# Patient Record
Sex: FEMALE | Race: WHITE
[De-identification: names, ages, dates, MRNs, and addresses within clinical notes are randomized per-mention and may not be internally consistent; named-entity substitution may affect disease eponyms.]

---

## 2017-05-19 NOTE — DIAGNOSTIC IMAGING REPORT
EXAMINATION: PELVIC ULTRASOUND (transabdominal and endovaginal scanning)



CLINICAL HISTORY: Adnexal mass    



COMPARISON STUDY:  5/11/2016



FINDINGS: 

The uterus measured 7.6 x 3.1 x 4.5 cm. The bulging gland cysts are visualized.

The endometrial stripe measured 9 mm.

The right ovary measured 30 x 18 x 24 mm.

The left ovary measured 38 x 18 x 24 mm..

There is no ultrasonographic evidence of ovarian torsion. It should be noted

that ovarian torsion can be present with normal Doppler ultrasonographic

findings.

There is trace free fluid likely physiologic.



IMPRESSION:  Normal pelvic ultrasound.







Electronically signed by:  Panda Castillo M.D.

5/19/2017 12:24 PM



Dictated Date/Time:  5/19/2017 12:23 PM

## 2017-08-04 NOTE — DIAGNOSTIC IMAGING REPORT
ECTOPIC FETAL



CLINICAL HISTORY: RLQ abd pain, 5 weeks Amadeo pain



TECHNIQUE: Ultrasound



COMPARISON STUDY:  5/19/2017



FINDINGS: Intrauterine gestational sac measuring 1 cm. No fetal pole is

confirmed on this may be secondary to the early gestational age. A yolk sac is

present.



Right ovary measures 3.3 cm at maximum with normal vascular flow. 1.7 cm corpus

luteum cyst. Left ovary measures 2.8 cm with normal vascular flow.



IMPRESSION:  

1. Early intrauterine gestational sac demonstrating a yolk sac.

2. A fetal pole is not confirmed, although this may be secondary to the early

gestational age is 5 weeks.

3. 1.7 cm corpus luteum cyst right ovary.

4. Follow-up ultrasound is recommended in one to 2 weeks to confirm viability









The above report was generated using voice recognition software.  It may contain

grammatical, syntax or spelling errors.







Electronically signed by:  Marcus Kauffman M.D.

8/4/2017 11:07 AM



Dictated Date/Time:  8/4/2017 11:04 AM

## 2017-08-04 NOTE — EMERGENCY ROOM VISIT NOTE
History


Report prepared by Shilpa:  Colleen Woody


Under the Supervision of:  Dr. Elsa Arevalo M.D.


First contact with patient:  09:41


Chief Complaint:  ABDOMINAL PAIN


Stated Complaint:  SEVERE ABD. CRAMPS, HIP PAIN-RIGHT SIDE





History of Present Illness


The patient is a 32 year old female who presents to the Emergency Room with 

complaints of waxing and waning abdominal pain for the past 4 days. The patient 

took a pregnancy test at home 4 days ago because of a missed period. The test 

was positive and she estimates that she is about 5 weeks pregnant. Over the 

past few days she has had some lower abdominal cramping that is worse on the 

right side. She states that her pain has been getting more intense, and last 

night her pain was so severe that she was doubled over. The patient reports 

burning pain into her right hip. She rates her pain as an 8/10 in severity. She 

denies any vaginal bleeding or previous gynecologic problems. This is her first 

pregnancy. Her blood type is A+ and she has never had a blood transfusion. The 

patient has an ob-gyn appointment in 5 days.





   Source of History:  patient


   Onset:  4 days ago


   Position:  abdomen


   Symptom Intensity:  8/10


   Quality:  burning, cramping


   Timing:  waxes/wanes


Note:


Pt denies vaginal bleeding.





Review of Systems


See HPI for pertinent positives & negatives. A total of 10 systems reviewed and 

were otherwise negative.





Past Medical & Surgical


Medical Problems:


(1) Asthma


(2) Bronchitis


Surgical Problems:


(1) H/O wisdom tooth extraction








Family History





Cancer


Heart disease


Hypertension


Kidney disease


Kidney stones





Social History


Smoking Status:  Never Smoker


Smokeless Tobacco Use:  No


Alcohol Use:  occasionally


Marital Status:  in relationship


Housing Status:  lives with significant other


Occupation Status:  employed





Current/Historical Medications


Scheduled


Buspirone Hcl (Buspirone Hcl), 1 TAB PO QPM


Citalopram Hydrobromide (Celexa), 10 MG PO DAILY





Allergies


Coded Allergies:  


     Cefuroxime (Unverified  Adverse Reaction, Unknown, UNK, 5/11/16)


     Clindamycin (Unverified  Adverse Reaction, Unknown, UNK, 5/11/16)


     Doxycycline (Unverified  Adverse Reaction, Unknown, UNK, 5/11/16)


     Erythromycin (Unverified  Adverse Reaction, Unknown, unk, 5/11/16)





Physical Exam


Vital Signs











  Date Time  Temp Pulse Resp B/P (MAP) Pulse Ox O2 Delivery O2 Flow Rate FiO2


 


8/4/17 12:15  89 18 111/63 100   


 


8/4/17 11:19  80 22 105/72 100 Room Air  


 


8/4/17 10:05  89      


 


8/4/17 09:35 36.9 78 20 121/80 100 Room Air  











Physical Exam


Vital signs reviewed.





General: Well-appearing 32 year old female, in no significant distress.





HEENT: No scleral icterus, PERRLA, neck supple.  Atraumatic.





Cardiovascular: Regular rate and rhythm, no extra sounds.





Pulmonary: Clear to auscultation bilaterally, normal work of breathing.





Abdomen: Soft, mild RLQ tenderness, no rebound or guarding, nondistended, 

positive bowel sounds.





Musculoskeletal: Atraumatic, no peripheral edema.





Neurologic: Patient awake alert and oriented x 3, full strength in all 4 

extremities.  Cranial nerves 2 through 12 grossly intact.





Skin: Warm, dry, no rash





Medical Decision & Procedures


ER Provider


Diagnostic Interpretation:


Radiology results as stated below per my review and radiologist interpretation:





ECTOPIC FETAL





CLINICAL HISTORY: RLQ abd pain, 5 weeks Amadeo pain





TECHNIQUE: Ultrasound





COMPARISON STUDY:  5/19/2017





FINDINGS: Intrauterine gestational sac measuring 1 cm. No fetal pole is


confirmed on this may be secondary to the early gestational age. A yolk sac is


present.





Right ovary measures 3.3 cm at maximum with normal vascular flow. 1.7 cm corpus


luteum cyst. Left ovary measures 2.8 cm with normal vascular flow.





IMPRESSION:  


1. Early intrauterine gestational sac demonstrating a yolk sac.


2. A fetal pole is not confirmed, although this may be secondary to the early


gestational age is 5 weeks.


3. 1.7 cm corpus luteum cyst right ovary.


4. Follow-up ultrasound is recommended in one to 2 weeks to confirm viability














The above report was generated using voice recognition software.  It may contain


grammatical, syntax or spelling errors.











Electronically signed by:  Marcus Kauffman M.D.


8/4/2017 11:07 AM





Dictated Date/Time:  8/4/2017 11:04 AM





Laboratory Results


8/4/17 09:51








Red Blood Count 4.20, Mean Corpuscular Volume 88.3, Mean Corpuscular Hemoglobin 

31.4, Mean Corpuscular Hemoglobin Concent 35.6, Mean Platelet Volume 8.9, 

Neutrophils (%) (Auto) 56.8, Lymphocytes (%) (Auto) 31.0, Monocytes (%) (Auto) 

9.4, Eosinophils (%) (Auto) 1.9, Basophils (%) (Auto) 0.7, Neutrophils # (Auto) 

2.34, Lymphocytes # (Auto) 1.28, Monocytes # (Auto) 0.39, Eosinophils # (Auto) 

0.08, Basophils # (Auto) 0.03





8/4/17 09:51

















Test


  8/4/17


09:51 8/4/17


10:05 8/4/17


10:25


 


White Blood Count


  4.13 K/uL


(4.8-10.8) 


  


 


 


Red Blood Count


  4.20 M/uL


(4.2-5.4) 


  


 


 


Hemoglobin


  13.2 g/dL


(12.0-16.0) 


  


 


 


Hematocrit 37.1 % (37-47)   


 


Mean Corpuscular Volume


  88.3 fL


() 


  


 


 


Mean Corpuscular Hemoglobin


  31.4 pg


(25-34) 


  


 


 


Mean Corpuscular Hemoglobin


Concent 35.6 g/dl


(32-36) 


  


 


 


Platelet Count


  303 K/uL


(130-400) 


  


 


 


Mean Platelet Volume


  8.9 fL


(7.4-10.4) 


  


 


 


Neutrophils (%) (Auto) 56.8 %   


 


Lymphocytes (%) (Auto) 31.0 %   


 


Monocytes (%) (Auto) 9.4 %   


 


Eosinophils (%) (Auto) 1.9 %   


 


Basophils (%) (Auto) 0.7 %   


 


Neutrophils # (Auto)


  2.34 K/uL


(1.4-6.5) 


  


 


 


Lymphocytes # (Auto)


  1.28 K/uL


(1.2-3.4) 


  


 


 


Monocytes # (Auto)


  0.39 K/uL


(0.11-0.59) 


  


 


 


Eosinophils # (Auto)


  0.08 K/uL


(0-0.5) 


  


 


 


Basophils # (Auto)


  0.03 K/uL


(0-0.2) 


  


 


 


RDW Standard Deviation


  39.0 fL


(36.4-46.3) 


  


 


 


RDW Coefficient of Variation


  12.1 %


(11.5-14.5) 


  


 


 


Immature Granulocyte % (Auto) 0.2 %   


 


Immature Granulocyte # (Auto)


  0.01 K/uL


(0.00-0.02) 


  


 


 


Anion Gap


  6.0 mmol/L


(3-11) 


  


 


 


Estimated GFR (


American) 131.5 


  


  


 


 


Estimated GFR (Non-


American 113.5 


  


  


 


 


BUN/Creatinine Ratio 16.3 (10-20)   


 


Calcium Level


  8.8 mg/dl


(8.5-10.1) 


  


 


 


Total Bilirubin


  0.4 mg/dl


(0.2-1) 


  


 


 


Direct Bilirubin


  < 0.1 mg/dl


(0-0.2) 


  


 


 


Aspartate Amino Transf


(AST/SGOT) 15 U/L (15-37) 


  


  


 


 


Alanine Aminotransferase


(ALT/SGPT) 19 U/L (12-78) 


  


  


 


 


Alkaline Phosphatase


  49 U/L


() 


  


 


 


Total Protein


  7.2 gm/dl


(6.4-8.2) 


  


 


 


Albumin


  3.9 gm/dl


(3.4-5.0) 


  


 


 


Human Chorionic Gonadotropin,


Qual POS (NEG) 


  


  


 


 


Urine Color  YELLOW  


 


Urine Appearance  CLEAR (CLEAR)  


 


Urine pH  7.0 (4.5-7.5)  


 


Urine Specific Gravity


  


  1.014


(1.000-1.030) 


 


 


Urine Protein  NEG (NEG)  


 


Urine Glucose (UA)  NEG (NEG)  


 


Urine Ketones  NEG (NEG)  


 


Urine Occult Blood  TRACE (NEG)  


 


Urine Nitrite  NEG (NEG)  


 


Urine Bilirubin  NEG (NEG)  


 


Urine Urobilinogen  NEG (NEG)  


 


Urine Leukocyte Esterase  MODERATE (NEG)  


 


Urine WBC (Auto)


  


  5-10 /hpf


(0-5) 


 


 


Urine RBC (Auto)


  


  5-10 /hpf


(0-4) 


 


 


Urine Hyaline Casts (Auto)  1-5 /lpf (0-5)  


 


Urine Epithelial Cells (Auto)  >30 /lpf (0-5)  


 


Urine Bacteria (Auto)  1+ (NEG)  


 


Human Chorionic Gonadotropin,


Quant 


  


  82118 mIU/mL 


 














 Date/Time


Source Procedure


Growth Status


 


 


 8/4/17 10:05


Urine , Clean Catch Urine Culture - Final


THREE TYPES OF ORGANSIMS PRESENT, ALL... Complete





Laboratory results per my review.





Medications Administered











 Medications


  (Trade)  Dose


 Ordered  Sig/Shaun


 Route  Start Time


 Stop Time Status Last Admin


Dose Admin


 


 Sodium Chloride  1,000 ml @ 


 999 mls/hr  Q1H1M STAT


 IV  8/4/17 09:46


 8/4/17 10:46 DC 8/4/17 09:59


999 MLS/HR


 


 Morphine Sulfate


  (MoRPHine


 SULFATE INJ)  4 mg  NOW  STAT


 IV  8/4/17 09:46


 8/4/17 09:48 DC 8/4/17 10:00


4 MG


 


 Ondansetron HCl


  (Zofran Inj)  4 mg  NOW  STAT


 IV  8/4/17 09:46


 8/4/17 09:48 DC 8/4/17 09:59


4 MG











ED Course


0941: Past medical records reviewed. The patient was evaluated in room B12B. A 

complete history and physical examination was performed. 





0946: Zofran 4 mg IV, Morphine sulfate 4 mg IV, NSS 1000 ml @ 999 mls/hr IV 





1140: I updated the patient on her US results. 





1200: I reassessed the patient at this time. She is feeling better and resting 

comfortably. I discussed the results and treatment plan with the patient. I 

answered all pertaining questions that she had. She expressed understanding and 

verbalized agreement. The patient will be discharged home.





Medical Decision


Differential diagnosis:


Etiologies such as appendicitis, diverticulitis, PUD, biliary pathology, UTI, 

pancreatitis, obstruction, mesenteric ischemia, aortic pathology, infections, 

inflammatory bowel disease, renal colic, ectopic pregnancy, intrauterine 

pregnancy, kidney stone, ovarian cyst, as well as others were entertained. 





This patient was evaluated and appeared to be in no significant distress.  IV 

access was obtained and laboratory work was drawn.  Patient was placed on the 

cardiac monitor and found to be cholecystitis rhythm.  Patient was hydrated 

with normal saline solution.  Patient was given morphine and Zofran for her 

discomforts.  Ultrasound pelvis was performed and reveals an IUP.  Patient was 

informed of the findings.  She'll follow-up with her OB/GYN this week and 

return to the ER for worsening of symptoms or any medical concerns.





Impression





 Primary Impression:  


 First trimester pregnancy


 Additional Impression:  


 Right lower quadrant abdominal pain





Scribe Attestation


The scribe's documentation has been prepared under my direction and personally 

reviewed by me in its entirety. I confirm that the note above accurately 

reflects all work, treatment, procedures, and medical decision making performed 

by me.





Departure Information


Dispostion


Home / Self-Care





Referrals


Andres Mondragon M.D. (PCP)





Forms


Call Back Authorization, HOME CARE DOCUMENTATION FORM,                         

                                       IMPORTANT VISIT INFORMATION





Patient Instructions


My [facility]





Additional Instructions





Diagnosis: Right lower quadrant abdominal pain, first trimester pregnancy.





Tylenol 650 mg every 6 hours as needed for pain.





Drink plenty of clear fluids and do not overexert herself.





2 chewable vitamins daily.





Avoid alcohol and illicit substances.





Do not use ibuprofen or aspirin.





Follow-up with your OB/GYN as scheduled if not sooner.





Return to the emergency department for worsening of symptoms or any medical 

concerns.





Problem Qualifiers

## 2018-03-27 ENCOUNTER — HOSPITAL ENCOUNTER (INPATIENT)
Dept: HOSPITAL 45 - C.OPB | Age: 33
LOS: 2 days | Discharge: HOME | End: 2018-03-29
Attending: OBSTETRICS & GYNECOLOGY | Admitting: OBSTETRICS & GYNECOLOGY
Payer: COMMERCIAL

## 2018-03-27 VITALS
SYSTOLIC BLOOD PRESSURE: 129 MMHG | TEMPERATURE: 98.06 F | HEART RATE: 85 BPM | OXYGEN SATURATION: 99 % | DIASTOLIC BLOOD PRESSURE: 80 MMHG

## 2018-03-27 VITALS
WEIGHT: 193.35 LBS | WEIGHT: 193.35 LBS | BODY MASS INDEX: 33.01 KG/M2 | BODY MASS INDEX: 33.01 KG/M2 | HEIGHT: 64.02 IN | HEIGHT: 64.02 IN

## 2018-03-27 VITALS — HEART RATE: 96 BPM | TEMPERATURE: 98.42 F | SYSTOLIC BLOOD PRESSURE: 120 MMHG | DIASTOLIC BLOOD PRESSURE: 79 MMHG

## 2018-03-27 VITALS
SYSTOLIC BLOOD PRESSURE: 121 MMHG | HEART RATE: 118 BPM | DIASTOLIC BLOOD PRESSURE: 77 MMHG | OXYGEN SATURATION: 98 % | TEMPERATURE: 97.88 F

## 2018-03-27 VITALS — HEART RATE: 96 BPM | DIASTOLIC BLOOD PRESSURE: 79 MMHG | TEMPERATURE: 98.78 F | SYSTOLIC BLOOD PRESSURE: 119 MMHG

## 2018-03-27 DIAGNOSIS — Z22.330: ICD-10-CM

## 2018-03-27 DIAGNOSIS — Z3A.39: ICD-10-CM

## 2018-03-27 DIAGNOSIS — F41.9: ICD-10-CM

## 2018-03-27 DIAGNOSIS — O99.513: ICD-10-CM

## 2018-03-27 DIAGNOSIS — J45.909: ICD-10-CM

## 2018-03-27 DIAGNOSIS — F32.9: ICD-10-CM

## 2018-03-27 DIAGNOSIS — O99.343: Primary | ICD-10-CM

## 2018-03-27 LAB
EOSINOPHIL NFR BLD AUTO: 220 K/UL (ref 130–400)
HCT VFR BLD CALC: 38.5 % (ref 37–47)
HGB BLD-MCNC: 13.6 G/DL (ref 12–16)
MCH RBC QN AUTO: 29.8 PG (ref 25–34)
MCHC RBC AUTO-ENTMCNC: 35.3 G/DL (ref 32–36)
MCV RBC AUTO: 84.4 FL (ref 80–100)
PMV BLD AUTO: 10.4 FL (ref 7.4–10.4)
RED CELL DISTRIBUTION WIDTH CV: 13.3 % (ref 11.5–14.5)
RED CELL DISTRIBUTION WIDTH SD: 40 FL (ref 36.4–46.3)
WBC # BLD AUTO: 15.79 K/UL (ref 4.8–10.8)

## 2018-03-27 PROCEDURE — 0KQM0ZZ REPAIR PERINEUM MUSCLE, OPEN APPROACH: ICD-10-PCS | Performed by: OBSTETRICS & GYNECOLOGY

## 2018-03-27 RX ADMIN — Medication PRN MG: at 23:26

## 2018-03-27 RX ADMIN — CITALOPRAM HYDROBROMIDE SCH MG: 20 TABLET ORAL at 21:36

## 2018-03-27 RX ADMIN — Medication PRN MG: at 16:12

## 2018-03-27 RX ADMIN — DOCUSATE SODIUM SCH MG: 100 CAPSULE, LIQUID FILLED ORAL at 20:33

## 2018-03-27 NOTE — DELIVERY SUMMARY
DATE OF OPERATION:  03/27/2018

 

TIME OF DELIVERY OF BABY:  8:59 a.m.

 

TIME OF DELIVERY OF PLACENTA:  9:30 a.m.

 

DETAILS OF ADMISSION AND DELIVERY:  The patient is a 32-year-old G1, P0, at

39 weeks of gestation, who presented to labor and delivery this morning for

labor check.  She started to feel contractions at around 0030am this

morning, they were every 5 minutes initially and then get closer and more

painful.

When she first  presented to L&D her cervix was 3 cm

dilated, 25% effaced, -3, and posterior location and  the contractions were 
every 3-7 minutes.

Her nurse discussed the case with Dr. Hernandez who recommended to recheck her in 
2 hours.  She

ambulated and then she came back to monitor again.  She was having

contractions every 3-4 minutes.  She was taken off from monitor at 7:46 a.m. 

Until then, she was not very uncomfortable.  Her contractions were mild to

moderate and she was able to talk and laugh with them.  She was going to

take another walk until 8:15 but she suddenly got uncomfortable at 8:00 a.m.

and then she was brought back to the bed by YASMIN Johnson, who

checked her and found that she was fully dilated with tight bulging membranes

which spontaneously ruptured.

 I was

called into the room.  When I presented to the room, she was uncomfortable

and involuntarily pushing.  I checked her cervix, has thin rim on the right 
side 

right side,  about 9 cm, 90% effaced and 0 station.  The fetal heart rate

was in 120s.  She had not had IV at that point.  IV was then started and 
pharmacy

was called for penicillin due to GBS positive status.  6 million units of

penicillin were given while she was pushing.  She pushed for about 45 minutes

and delivered the head without difficulty.  Shoulders came right after with the

head with one push.  The baby was handed off to mother where mouth and nose

were suctioned.  Cord was clamped x2 and cut at 1 minute delay.  Cord blood

was obtained.  Vagina and perineum were checked for lacerations.  There was a

second-degree perineal laceration and first-degree right labial laceration. 

Rectal exam was done and confirmed to be second degree.  Excellent sphincter

tone was noted.  Gloves were changed.

1% lidocaine was used for local anesthesia.

The perineal laceration was repaired

with 2-0 Vicryl in a running locked fashion and labial laceration was

repaired with 3-0 Vicryl in running fashion.  Excellent hemostasis was

achieved.  .  Placenta was found to

be in the vagina, delivered spontaneously intact and complete, and lower

segment was cleared of all clots and debris.  Fundus was firm.  EBL was 200. 

Mother and baby tolerated the procedure well.  At the end of the procedure,

sponge, lap, needle and instrument counts were correct x2.  The baby was a

viable female infant.  Apgars 8/9.  Weight was 3205 gr.  No complications

happened.  I was present during the whole procedure.

 

 

I attest to the content of the Intraoperative Record and any orders documented 
therein. Any exceptions are noted below.

 

 

 

MTDD

## 2018-03-28 VITALS
DIASTOLIC BLOOD PRESSURE: 81 MMHG | SYSTOLIC BLOOD PRESSURE: 120 MMHG | OXYGEN SATURATION: 98 % | HEART RATE: 92 BPM | TEMPERATURE: 98.24 F

## 2018-03-28 VITALS
SYSTOLIC BLOOD PRESSURE: 119 MMHG | HEART RATE: 96 BPM | TEMPERATURE: 98.24 F | OXYGEN SATURATION: 98 % | DIASTOLIC BLOOD PRESSURE: 82 MMHG

## 2018-03-28 VITALS — HEART RATE: 74 BPM | TEMPERATURE: 98.24 F | SYSTOLIC BLOOD PRESSURE: 117 MMHG | DIASTOLIC BLOOD PRESSURE: 78 MMHG

## 2018-03-28 VITALS
TEMPERATURE: 98.24 F | HEART RATE: 85 BPM | DIASTOLIC BLOOD PRESSURE: 73 MMHG | SYSTOLIC BLOOD PRESSURE: 113 MMHG | OXYGEN SATURATION: 99 %

## 2018-03-28 LAB
HCT VFR BLD CALC: 29.7 % (ref 37–47)
HGB BLD-MCNC: 10.4 G/DL (ref 12–16)

## 2018-03-28 RX ADMIN — Medication SCH TAB: at 08:38

## 2018-03-28 RX ADMIN — Medication PRN MG: at 08:39

## 2018-03-28 RX ADMIN — Medication PRN MG: at 16:07

## 2018-03-28 RX ADMIN — DOCUSATE SODIUM SCH MG: 100 CAPSULE, LIQUID FILLED ORAL at 20:30

## 2018-03-28 RX ADMIN — CITALOPRAM HYDROBROMIDE SCH MG: 20 TABLET ORAL at 20:30

## 2018-03-28 RX ADMIN — FERROUS SULFATE TAB EC 325 MG (65 MG FE EQUIVALENT) SCH MG: 325 (65 FE) TABLET DELAYED RESPONSE at 08:39

## 2018-03-28 RX ADMIN — DOCUSATE SODIUM SCH MG: 100 CAPSULE, LIQUID FILLED ORAL at 08:38

## 2018-03-28 NOTE — OB/GYN PROGRESS NOTE
OB/GYN Progress Note


Date of Service


Mar 28, 2018.





Subjective


conversation w/ patient, physical exam


Ambulation:  ambulating normally


Voiding:  no voiding problems


Passing Gas:  Yes


Diet Tolerance:  Regular Diet


Lochia:  Small


Feeding Type:  Breast Feeding





Objective


Vital Signs











  Date Time  Temp Pulse Resp B/P (MAP) Pulse Ox O2 Delivery O2 Flow Rate FiO2


 


3/28/18 03:05 36.8 85 18 113/73 (86) 99 Room Air  


 


3/27/18 23:20     99 Room Air  


 


3/27/18 23:20 36.7 85 16 129/80 (96) 99 Room Air  


 


3/27/18 20:20 36.9 96 18 120/79 (93)  Room Air  


 


3/27/18 16:15 37.1 96 18 119/79 (92)  Room Air  


 


3/27/18 16:15      Room Air  


 


3/27/18 12:51 36.6 118 20 121/77 (92) 98 Room Air  


 


3/27/18 12:51      Room Air  











Physical Exam


General Appearance:  WELL-APPEARING, NO APPARENT DISTRESS


Abdomen:  non tender, soft


Fundus:  Firm


Extremities:  normal inspection, no pedal edema, no calf tenderness





Laboratory Results





Last 24 Hours








Test


  3/27/18


09:48 3/28/18


06:40


 


White Blood Count 15.79 K/uL  


 


Red Blood Count 4.56 M/uL  


 


Hemoglobin 13.6 g/dL  10.4 g/dL 


 


Hematocrit 38.5 %  29.7 % 


 


Mean Corpuscular Volume 84.4 fL  


 


Mean Corpuscular Hemoglobin 29.8 pg  


 


Mean Corpuscular Hemoglobin


Concent 35.3 g/dl 


  


 


 


RDW Standard Deviation 40.0 fL  


 


RDW Coefficient of Variation 13.3 %  


 


Platelet Count 220 K/uL  


 


Mean Platelet Volume 10.4 fL  











Assessment and Plan


Post-Partum


Day Number:  1


Continue Routine Care:


TENT D/C IN am

## 2018-03-29 VITALS
TEMPERATURE: 98.24 F | HEART RATE: 90 BPM | SYSTOLIC BLOOD PRESSURE: 116 MMHG | OXYGEN SATURATION: 99 % | DIASTOLIC BLOOD PRESSURE: 81 MMHG

## 2018-03-29 VITALS — DIASTOLIC BLOOD PRESSURE: 81 MMHG | HEART RATE: 90 BPM | TEMPERATURE: 98.24 F

## 2018-03-29 VITALS — OXYGEN SATURATION: 99 %

## 2018-03-29 RX ADMIN — DOCUSATE SODIUM SCH MG: 100 CAPSULE, LIQUID FILLED ORAL at 08:30

## 2018-03-29 RX ADMIN — Medication SCH TAB: at 08:30

## 2018-03-29 RX ADMIN — Medication PRN MG: at 08:31

## 2018-03-29 RX ADMIN — FERROUS SULFATE TAB EC 325 MG (65 MG FE EQUIVALENT) SCH MG: 325 (65 FE) TABLET DELAYED RESPONSE at 08:30

## 2018-03-29 NOTE — OB/GYN PROGRESS NOTE
OB/GYN Progress Note


Date of Service:


Mar 29, 2018.


Patient is seen and examined.


She feels well, no complaints.


Ambulating without dizziness


Voiding without difficulty


Tolerating regular diet with out N&V


Bleeding is minimal


No fever/ chills/ CP/ SOB/ N&V/ Leg pain


Breast  feeding without problems














  Date Time  Temp Pulse Resp B/P (MAP) Pulse Ox O2 Delivery O2 Flow Rate FiO2


 


3/29/18 08:02 36.8 90 16 116/81 (93) 99 Room Air  


 


3/28/18 23:25      Room Air  


 


3/28/18 23:25 36.8 74 18 117/78 (91)  Room Air  


 


3/28/18 16:00 36.8 92 18 120/81 (94) 98 Room Air  


 


3/28/18 16:00      Room Air  

















Test


  3/27/18


09:48 3/28/18


06:40


 


White Blood Count 15.79  H 


 


Red Blood Count 4.56   


 


Hemoglobin 13.6   10.4  #L


 


Hematocrit 38.5   29.7  L


 


Mean Corpuscular Volume 84.4   


 


Mean Corpuscular Hemoglobin 29.8   


 


Mean Corpuscular Hemoglobin


Concent 35.3  


  


 


 


RDW Standard Deviation 40.0   


 


RDW Coefficient of Variation 13.3   


 


Platelet Count 220   


 


Mean Platelet Volume 10.4   














PE:


General: Alert, orientedx3, NAD


Abd: soft, NT, fundus firm, below Umbilicus


Perineum intact, Lochia rubra minimal


Ext; NT, no edema





AP: 33 yo s/p , ppd# 2


VSS Afebrile doing well


Continue routine postpartum care


Instructions were given when to call


All questions were answered


D/C home, f/u in office

## 2018-03-29 NOTE — DISCHARGE INSTRUCTIONS
Discharge Instructions


Date of Service


Mar 29, 2018.





Admission


Reason for Admission:  Labor Check





Discharge


Discharge Diagnosis / Problem:  





Discharge Goals


Goal(s):  Routine recovery after delivery





Activity Recommendations


Activity Limitations:  as noted below





ACTIVITY RECOMMENDATIONS:





* Gradual return to full activity over the next 2-3 weeks.


* No lifting - nothing heavier than baby over the next 2-3 weeks.


* Do not engage in vigorous exercise, sexual activity or sports until cleared 

by 


   your physician.


* Do not drive or operate any motorized equipment until cleared by your 

physician.


* You may shower/bathe daily.








BREAST CARE:





If you are not breast feeding:





*  Wear a supportive bra 24 hours a day for one to two weeks.


*  Avoid stimulating your breasts and nipples as much as possible during the


    first few weeks after delivery.


*  When taking a shower, have the warm water hit your back, not breasts.


*  When your breasts feel full, apply ice packs.  Usually three to four times 


    a day helps ease the discomfort.


*  Take a mild pain medication (Tylenol/Motrin) when you are uncomfortable.





If breast feeding:





*  Use breast milk to lubricate nipples.  Lansinoh cream may be used for sore 

nipples. 


    You do not need to remove cream prior to breast feeding.  If using a 

different 


    brand of cream, check the label for directions regarding removal of cream 

prior 


    to nursing.


*  Wear a supportive bra.


*  If having problems with breasts or breast feeding, call a lactation 

consultant or 


    your health care provider.








EPISIOTOMY CARE:





After delivery, if you have an episiotomy (stitches), the following steps will 


ease discomfort and aid healing.





*   For the first 24 hours after delivery, place ice packs next to your 

episiotomy 


    to help reduce swelling.


*  After the first 24 hour-period, sitz baths, either portable or in the tub, 

are 


    suggested.  A shower with a shower arm sprayed over the episiotomy may 


    be comforting.


*  Kelly care should be done after each voiding and bowel movement.  Squirt 


    warm water from a plastic bottle over the perineum (region of the body 


    between the anus and urinary opening) and pat dry.


*  Use Dermoplast to ease discomfort.  Shake container.  Spray directly over


    the episiotomy.  


*  Place a Tucks on a clean sanitary pad next to your episiotomy.








OVER THE COUNTER MEDICATION:





*  For discomfort or pain, you may use Acetaminophen (Tylenol), Ibuprofen (Advil

), or 


    Naproxen (Aleve) following the package directions. 


*  For constipation you may use Colace following the package directions.








SPECIAL CARE INSTRUCTIONS:





When you are discharged from the hospital, it is important for you to follow 


the instructions listed below:





*  During the first week at home, you should be able to care for yourself and


    your baby.  In addition, the usual light household activities are 

encouraged.





*  Limit your activities to the way you feel.  Do not try to clean the house or 


    move furniture.  Be sensible.





*  If you actively engage in sports and have done so up until the time of your


   delivery, you may resume these activities as soon as you feel able.  This may


   take up to one month or even longer.  Use good judgment.





*  Continue to take your prenatal vitamins for at least six weeks after the 

birth


    of your baby.





*  Your diet need not be limited unless you were on a special diet before your 


    delivery.  Breast-feeding mothers need around 2500 calories per day and at


    least 64-80 ounces of fluid per day (8 to 10 glasses).





*  You should eat foods from the four major food groups.  Crash diets or fad 

diets


    are to be avoided.  Eating lean meats, fresh fruits and vegetables, low-fat 


    dairy products, high fiber foods and a regular exercise program, will help 

you 


    get back to your pre-pregnancy weight without putting your health at risk.





*  Constipation is sometimes a problem after delivery.  Take a mild laxative as 


    needed.  If breast feeding, Milk of Magnesia is acceptable to use. You may 


    use a suppository or Fleets enema if no episiotomy.





*  A daily shower or tub bath is suggested.  Be sure to thoroughly and gently 


    dry the perineum.





*  A bloody vaginal discharge will usually continue until around four weeks post


    partum.  A small amount of bleeding may continue for as long as six weeks.  


    Vaginal discharge changes from the bright red bleeding after delivery to 

pink 


    then brownish and finally yellowish-pink before becoming white and 

disappearing.





*  Bleeding may increase with activity.  Your first period may come in 4-8 

weeks.


    If you are breast feeding, your period may be delayed even longer.





*  Rock (sex) can begin whenever both you and your partner feel 

comfortable


    and do not have any form of genital infection.  It is recommended that you 

wait


    until after your return postpartum appointment and discuss with your 

physician.


    If you have questions, please talk to your health care practitioner.  A 

condom 


    should be used to prevent infection and pregnancy.





*  Foreplay, gentle intercourse and lubrication is very important the first 

several 


    times to prevent pain.  A water-based lubricant such as K-Y jelly or 

Astroglide 


    may be used.





*  Tampons may be used six weeks after delivery.





*  Douching should be avoided for 6 weeks after delivery.





*  If you have RH negative blood and your baby is RH positive, you will receive 


    RHOGAM by injection prior to discharge.  The nurse will give you a card to 


    keep with you that has the date and place that you received RHOGAM after 


    delivery.





*  During your prenatal care, you had a Rubella screen done to check for the 


    presence of rubella antibodies in your blood.  If your test was negative, 

you


    will receive a Rubella vaccine prior to discharge.  This vaccine may cause 

a 


    fever, soreness at the injection site and flu-like symptoms.  If these 

symptoms


    persist, notify your health care practitioner.   Pregnancy is not advised 

for 


    three months after a Rubella vaccine.  There is a higher chance of having a 


    baby with birth defects if conceived within three months of getting the 

vaccine.





*  If you were discharged 24 hours from delivery or before 48 hours: Visiting 

nurses 


    will come to your home 48 hours after discharge to assess you and your 

baby.  


    The visiting nurse will meet with you while you are in the hospital to 

arrange a 


    time and get directions to your home.





*  Verbalizes understanding of car seat law as reviewed with patient nursing.





*  Car Seat hand-out given and reviewed with patient by nursing.





*  Shaken baby information reviewed with patient by nursing.


 





Call you doctor if:





*  Heavy bleeding (saturating several pads an hour) or passing clots the size 

of 


    your fist.


*  A fever >101 degrees F (38.3 degrees C) on two occasions four hours apart 


    and/or chills.


*  Unusual pain in the pelvic or vaginal areas.


*  "Baby Blues" lasting longer than two weeks.





If you have any questions or concerns, call your health care practitioner 


at [phone].








FOLLOW-UP VISIT:





*  Please call the office at [phone] to schedule a 6 week postpartum


    examination.  It is important you keep this appointment.  


*  It is important for you to make arrangements for either yearly or twice 


    yearly check-ups thereafter.











.





Current Hospital Diet


Patient's current hospital diet: Regular OB Diet





Discharge Diet


Recommended Diet:  Regular Diet





Pending Studies


Studies pending at discharge:  no





Medical Emergencies








.


Who to Call and When:





Medical Emergencies:  If at any time you feel your situation is an emergency, 

please call 911 immediately.





.





Non-Emergent Contact


Non-Emergency issues call your:  Specialist


Call Non-Emergent contact if:  you have a fever, temperature is above 100.5, 

temperature is above 101, your pain is not controlled, your pain is worsening





.


.








"Provider Documentation" section prepared by Quan Pendleton.








.